# Patient Record
Sex: FEMALE
[De-identification: names, ages, dates, MRNs, and addresses within clinical notes are randomized per-mention and may not be internally consistent; named-entity substitution may affect disease eponyms.]

---

## 2018-07-15 ENCOUNTER — TRANSCRIPTION ENCOUNTER (OUTPATIENT)
Age: 48
End: 2018-07-15

## 2018-09-11 ENCOUNTER — TRANSCRIPTION ENCOUNTER (OUTPATIENT)
Age: 48
End: 2018-09-11

## 2022-04-06 ENCOUNTER — EMERGENCY (EMERGENCY)
Facility: HOSPITAL | Age: 52
LOS: 1 days | Discharge: HOME | End: 2022-04-06
Attending: EMERGENCY MEDICINE
Payer: COMMERCIAL

## 2022-04-06 VITALS
HEIGHT: 63 IN | HEART RATE: 79 BPM | DIASTOLIC BLOOD PRESSURE: 77 MMHG | RESPIRATION RATE: 19 BRPM | OXYGEN SATURATION: 100 % | WEIGHT: 223.99 LBS | SYSTOLIC BLOOD PRESSURE: 116 MMHG | TEMPERATURE: 98 F

## 2022-04-06 PROCEDURE — 99053 MED SERV 10PM-8AM 24 HR FAC: CPT

## 2022-04-06 PROCEDURE — 99283 EMERGENCY DEPT VISIT LOW MDM: CPT

## 2022-04-06 RX ORDER — DEXAMETHASONE 0.5 MG/5ML
6 ELIXIR ORAL ONCE
Refills: 0 | Status: DISCONTINUED | OUTPATIENT
Start: 2022-04-06 | End: 2022-04-06

## 2022-04-06 RX ORDER — DEXAMETHASONE 0.5 MG/5ML
6 ELIXIR ORAL ONCE
Refills: 0 | Status: COMPLETED | OUTPATIENT
Start: 2022-04-06 | End: 2022-04-06

## 2022-04-06 RX ORDER — EPINEPHRINE 0.3 MG/.3ML
0.3 INJECTION INTRAMUSCULAR; SUBCUTANEOUS
Qty: 1 | Refills: 0
Start: 2022-04-06 | End: 2022-04-06

## 2022-04-06 RX ORDER — DIPHENHYDRAMINE HCL 50 MG
25 CAPSULE ORAL ONCE
Refills: 0 | Status: DISCONTINUED | OUTPATIENT
Start: 2022-04-06 | End: 2022-04-06

## 2022-04-06 RX ADMIN — Medication 6 MILLIGRAM(S): at 07:02

## 2022-04-06 NOTE — ED PROVIDER NOTE - NS ED ROS FT
GENERAL: No fever, no chills  EYES: No change in vision  HEENT: No trouble swallowing or speaking  CARDIAC: No chest pain  PULMONARY: No cough, no SOB  GI: No abdominal pain, no nausea or no vomiting, no diarrhea, no constipation  : No changes in urination  SKIN: +rashes  NEURO: No headache, no numbness  MSK: No joint pain  Otherwise as HPI or negative.

## 2022-04-06 NOTE — ED PROVIDER NOTE - OBJECTIVE STATEMENT
52yo woman presenting with worsening rash for 2 days over the face, neck, and right hand. Patient states she noticed it after waking yesterday morning on her right neck and right hand, worsened during the day, took benadryl and put on anti-itch lotion, did not seem to help. Denies fevers, SOB, chest pain, new lotions/soaps/perfumes.

## 2022-04-06 NOTE — ED ADULT NURSE NOTE - PAIN: PRESENCE, MLM
Marlin Briones Patient Age: 52 year old  MESSAGE:     Patient states she wanted to let Dr Moy know that she dropped off a no restrictions form for Dr Moy to fill out. States she needs it back in about a week.   Patient states she is applying for a new employment and she needs to have a measles mumps and rubella titers.   States she did call the health department and they have no records of this.   Patient wants a call back asap.     Routing to providers pool       Next and Last Visit with Provider and Department  Last visit with this provider: Visit date not found  Last visit with this department: Visit date not found    Next visit with this provider: Visit date not found  Next visit with this department: Visit date not found    WEIGHT AND HEIGHT:   Wt Readings from Last 1 Encounters:   10/04/17 59 kg (130 lb)     Ht Readings from Last 1 Encounters:   10/04/17 5' 4\" (1.626 m)     BMI Readings from Last 1 Encounters:   10/04/17 22.31 kg/m²       ALLERGIES: not on file.  No current outpatient medications on file.     No current facility-administered medications for this visit.      PHARMACY to use:Please ask patient if needed.             Pharmacy preference(s) on file: No Pharmacies Listed    CALL BACK INFO: Ok to leave response (including medical information) with family member or on answering machine  ROUTING: Patient's physician/staff        PCP: Lyndon Moy MD         INS: Payor: CLAUDIO / Plan: OPEN ACCESS [id number] / Product Type: POS MISC   PATIENT ADDRESS:  [address] [place] [address] [place] [address]     denies pain/discomfort

## 2022-04-06 NOTE — ED PROVIDER NOTE - CLINICAL SUMMARY MEDICAL DECISION MAKING FREE TEXT BOX
Dio, PGY1 - hives, unknown allergic source. meds, reassess. *The above represents an initial assessment/impression. Please refer to progress notes for potential changes in patient clinical course*

## 2022-04-06 NOTE — ED PROVIDER NOTE - NSFOLLOWUPINSTRUCTIONS_ED_ALL_ED_FT
You were seen in the Emergency Room today because of hives.   Please follow-up with your Primary Care Physician within the week.     We have sent medication to your Pharmacy. It is an EpiPen. Use it if your reaction continues to worsen and you start having symptoms of an allergic reaction.     Call 911 if you have skin rash, hives, swelling, or itching and any of the following:   •You have trouble breathing, shortness of breath, wheezing, or coughing.  •Your throat tightens or your lips or tongue swell.  •You have difficulty swallowing or speaking.  •You are dizzy, lightheaded, confused, or feel like you are going to faint.  •You have nausea, diarrhea, or abdominal cramps, or you are vomiting.

## 2022-04-06 NOTE — ED PROVIDER NOTE - PATIENT PORTAL LINK FT
You can access the FollowMyHealth Patient Portal offered by [facility] by registering at the following website: [email or web address]. By joining [facility]’s FollowMyHealth portal, you will also be able to view your health information using other applications (apps) compatible with our system.

## 2022-04-06 NOTE — ED PROVIDER NOTE - PROGRESS NOTE DETAILS
Dio, PGY1 - Patient stable for discharge. Understands the Emergency Room work-up and discharge precautions. Will follow-up with pcp

## 2022-04-06 NOTE — ED PROVIDER NOTE - ATTENDING CONTRIBUTION TO CARE
MD Leach:  patient seen and evaluated personally.   I agree with the History & Physical,  Impression & Plan other than what was detailed in my note.  MD Leach  50 y/o f presenting to ed cc of rash started yesterday on hand and face but has now gotten worse, States it is pururitic took diphenhydramine for it last night, is here because has function to go to later and wants to make it go away, pt has kna no knew exposures she can think of, no new cosmetics, detergents, no lip/tongue swelling, no airway inovlvement, did have sensation of something in throat which is also why she came in, no ha, bv, palp, n/v, no pi eposure, has not been outdoors, afebrile vitals stable,  non toxic well appearing, NC/AT,  conjunctiva non conjected, sclera anicteric, moist mucous membranes, neck supple, heart sounds, normal, no mrg, lungs cta b/l no wrr, abd soft non distended w/ no tenderness, no visual deformities of extremities, axox3, pos erythema over face and r hand, on ears, no lip/tongue swelling, no trismus, no muffled voice. plan for dose of steroids in ed, not consistent w/ anaphylaxis, mild allergic reaction vs contact dermatitis most likely discussed strict return precautions.

## 2022-04-06 NOTE — ED PROVIDER NOTE - PHYSICAL EXAMINATION
Gen: NAD, AOx3, able to make needs known, non-toxic  Head: NCAT  HEENT: EOMI, normal conjunctiva. +hives over the face, neck, and right hand. +hives underneath the left breast.   Lung: CTAB, no respiratory distress, no wheezes/rhonchi/rales B/L, speaking in full sentences  CV: RRR, no M/R/G, pulses bilaterally   Abd: soft, NTND, no guarding, no CVA tenderness  MSK: no visible bony deformities  Neuro: No focal sensory or motor deficits  Skin: Warm, well perfused, no rash  Psych: normal affect

## 2022-04-06 NOTE — ED ADULT NURSE NOTE - OBJECTIVE STATEMENT
51 year old female c/o rash. Pt reports small rash began on right side of neck yesterday, which she took Benadryl for last night with no relief. Pt reports rash became itchy and spread to right forearm and face this morning. Pt presents with a swollen left eye and states she feels a "lump in throat". Pt denies any new foods, drinks, supplements, detergents, lotions, or environmental factors. Airway and breathing intact, speaking in full sentences. Pt denies difficulty breathing, chest pain, N/V, abdominal pain, fevers or chills.

## 2023-05-27 ENCOUNTER — NON-APPOINTMENT (OUTPATIENT)
Age: 53
End: 2023-05-27

## 2023-08-15 PROBLEM — Z78.9 OTHER SPECIFIED HEALTH STATUS: Chronic | Status: ACTIVE | Noted: 2022-04-06

## 2023-08-17 ENCOUNTER — APPOINTMENT (OUTPATIENT)
Dept: MAMMOGRAPHY | Facility: CLINIC | Age: 53
End: 2023-08-17
Payer: COMMERCIAL

## 2023-08-17 PROCEDURE — 77063 BREAST TOMOSYNTHESIS BI: CPT

## 2023-08-17 PROCEDURE — 77067 SCR MAMMO BI INCL CAD: CPT

## 2024-08-26 ENCOUNTER — APPOINTMENT (OUTPATIENT)
Dept: ULTRASOUND IMAGING | Facility: CLINIC | Age: 54
End: 2024-08-26
Payer: COMMERCIAL

## 2024-08-26 ENCOUNTER — APPOINTMENT (OUTPATIENT)
Dept: MAMMOGRAPHY | Facility: CLINIC | Age: 54
End: 2024-08-26
Payer: COMMERCIAL

## 2024-08-26 PROCEDURE — 76856 US EXAM PELVIC COMPLETE: CPT

## 2024-08-26 PROCEDURE — 76830 TRANSVAGINAL US NON-OB: CPT

## 2024-08-26 PROCEDURE — 77063 BREAST TOMOSYNTHESIS BI: CPT

## 2024-08-26 PROCEDURE — 77067 SCR MAMMO BI INCL CAD: CPT

## 2024-08-28 ENCOUNTER — APPOINTMENT (OUTPATIENT)
Dept: RADIOLOGY | Facility: CLINIC | Age: 54
End: 2024-08-28
Payer: COMMERCIAL

## 2024-08-28 PROCEDURE — 77080 DXA BONE DENSITY AXIAL: CPT
